# Patient Record
Sex: MALE | URBAN - METROPOLITAN AREA
[De-identification: names, ages, dates, MRNs, and addresses within clinical notes are randomized per-mention and may not be internally consistent; named-entity substitution may affect disease eponyms.]

---
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
## 2023-01-19 ENCOUNTER — EMERGENCY (EMERGENCY)
Facility: HOSPITAL | Age: 23
LOS: 0 days | Discharge: HOME | End: 2023-01-19
Attending: STUDENT IN AN ORGANIZED HEALTH CARE EDUCATION/TRAINING PROGRAM
Payer: MEDICAID

## 2023-01-19 VITALS
SYSTOLIC BLOOD PRESSURE: 121 MMHG | RESPIRATION RATE: 18 BRPM | TEMPERATURE: 98 F | HEIGHT: 68 IN | WEIGHT: 147.93 LBS | DIASTOLIC BLOOD PRESSURE: 80 MMHG | OXYGEN SATURATION: 98 % | HEART RATE: 88 BPM

## 2023-01-19 VITALS
TEMPERATURE: 99 F | HEART RATE: 78 BPM | RESPIRATION RATE: 18 BRPM | DIASTOLIC BLOOD PRESSURE: 76 MMHG | OXYGEN SATURATION: 98 % | SYSTOLIC BLOOD PRESSURE: 114 MMHG

## 2023-01-19 DIAGNOSIS — R07.89 OTHER CHEST PAIN: ICD-10-CM

## 2023-01-19 DIAGNOSIS — R05.1 ACUTE COUGH: ICD-10-CM

## 2023-01-19 DIAGNOSIS — J45.909 UNSPECIFIED ASTHMA, UNCOMPLICATED: ICD-10-CM

## 2023-01-19 DIAGNOSIS — Z20.822 CONTACT WITH AND (SUSPECTED) EXPOSURE TO COVID-19: ICD-10-CM

## 2023-01-19 LAB
FLUAV AG NPH QL: SIGNIFICANT CHANGE UP
FLUBV AG NPH QL: SIGNIFICANT CHANGE UP
SARS-COV-2 RNA SPEC QL NAA+PROBE: SIGNIFICANT CHANGE UP

## 2023-01-19 PROCEDURE — 93010 ELECTROCARDIOGRAM REPORT: CPT

## 2023-01-19 PROCEDURE — 99284 EMERGENCY DEPT VISIT MOD MDM: CPT

## 2023-01-19 RX ORDER — IPRATROPIUM/ALBUTEROL SULFATE 18-103MCG
3 AEROSOL WITH ADAPTER (GRAM) INHALATION ONCE
Refills: 0 | Status: COMPLETED | OUTPATIENT
Start: 2023-01-19 | End: 2023-01-19

## 2023-01-19 RX ORDER — ACETAMINOPHEN 500 MG
975 TABLET ORAL ONCE
Refills: 0 | Status: COMPLETED | OUTPATIENT
Start: 2023-01-19 | End: 2023-01-19

## 2023-01-19 RX ORDER — IBUPROFEN 200 MG
600 TABLET ORAL ONCE
Refills: 0 | Status: COMPLETED | OUTPATIENT
Start: 2023-01-19 | End: 2023-01-19

## 2023-01-19 RX ORDER — ALBUTEROL 90 UG/1
2 AEROSOL, METERED ORAL
Qty: 1 | Refills: 0
Start: 2023-01-19

## 2023-01-19 RX ADMIN — Medication 600 MILLIGRAM(S): at 08:12

## 2023-01-19 RX ADMIN — Medication 975 MILLIGRAM(S): at 08:12

## 2023-01-19 RX ADMIN — Medication 50 MILLIGRAM(S): at 08:13

## 2023-01-19 RX ADMIN — Medication 3 MILLILITER(S): at 08:14

## 2023-01-19 NOTE — ED PROVIDER NOTE - PHYSICAL EXAMINATION
Gen: NAD, AOx3, able to make needs known, non-toxic  Head: NCAT  HEENT: EOMI, oral mucosa moist, normal conjunctiva. posterior oropharynx w/o tonsilar erythema/edema/exudates. uvula midline  Lung: CTAB, no respiratory distress, no wheezes/rhonchi/rales B/L, speaking in full sentences  CV: RRR, no murmurs  Abd: non distended, soft, nontender, no guarding, no CVA tenderness  MSK: no visible deformities  Neuro: Appears non focal  Skin: Warm, well perfused  Psych: normal affect

## 2023-01-19 NOTE — ED PROVIDER NOTE - PATIENT PORTAL LINK FT
You can access the FollowMyHealth Patient Portal offered by [facility] by registering at the following website: [email or web address]. By joining [employer]’s FollowMyHealth portal, you will also be able to view your health information using other applications (apps) compatible with our system.

## 2023-01-19 NOTE — ED PROVIDER NOTE - OBJECTIVE STATEMENT
23 y/o M w/ PMH of asthma presenting w/ cough. Reports symptoms developing on Tuesday. Has been having chills and subjective fevers at home. Has been coughing, sometimes dry sometimes w/ some clear mucous. Having sore throat as well but no difficulty swallowing. Also w/ generalized body aches. Endorsing chest tightness that feels like his asthma w/ associated wheezing sensation. Reports saw friend recently who developed flu-like symptoms. Tried nyquil at home. Does not have any albuterol at home. Is not covid vaccinated. Is not flu vaccinated. Denies headache, dizziness, blurred vision, shortness of breath, abdominal pain, n/v/d/c, urinary symptoms, MSK pain, rash.

## 2023-01-19 NOTE — ED PROVIDER NOTE - NSFOLLOWUPINSTRUCTIONS_ED_ALL_ED_FT
1) Follow up with your doctor this week  2) Return to the ED immediately for new or worsening symptoms  3) Please continue to take any home medications as prescribed  4) Your test results from your ED visit were discussed with you prior to discharge  5) You were provided with a copy of your test results  6) Please take Tylenol 975 mg every 6 hours as needed for pain. Please do not exceed more than 4,000mg of Tylenol in a day   7) Please take Motrin 600 mg by mouth every 6 hours as needed for pain. Please take this medication with food.     Viral Respiratory Infection    A viral respiratory infection is an illness that affects parts of the body used for breathing, like the lungs, nose, and throat. It is caused by a germ called a virus. Symptoms can include runny nose, coughing, sneezing, fatigue, body aches, sore throat, fever, or headache. Over the counter medicine can be used to manage the symptoms but the infection typically goes away on its own in 5 to 10 days.     SEEK IMMEDIATE MEDICAL CARE IF YOU HAVE ANY OF THE FOLLOWING SYMPTOMS: shortness of breath, chest pain, fever over 10 days, or lightheadedness/dizziness.    Asthma    Asthma is a condition in which the airways tighten and narrow, making it difficult to breath. Asthma episodes, also called asthma attacks, range from minor to life-threatening. Symptoms include wheezing, coughing, chest tightness, or shortness of breath. The diagnosis of asthma is made by a review of your medical history and a physical exam, but may involve additional testing. Asthma cannot be cured, but medicines and lifestyle changes can help control it. Avoid triggers of asthma which may include animal dander, pollen, mold, smoke, air pollutants, etc.     SEEK IMMEDIATE MEDICAL CARE IF YOU HAVE ANY OF THE FOLLOWING SYMPTOMS: worsening of symptoms, shortness of breath at rest, chest pain, bluish discoloration to lips or fingertips, lightheadedness/dizziness, or fever.

## 2023-01-19 NOTE — ED PROVIDER NOTE - PROGRESS NOTE DETAILS
Attending Georgi: pt reports symptoms improving. viral swab neg. Discussed w/ pt. Medications sent to pharmacy. return precautions provided and discussed. tello MANUEL.

## 2023-01-19 NOTE — ED PROVIDER NOTE - CLINICAL SUMMARY MEDICAL DECISION MAKING FREE TEXT BOX
21 y/o M w/ PMH as above presenting w/ cough, chest tightness, and chills. pt overall well appearing, no acute distress. No resp distress. Vitals reassuring. No wheezing on exam, but pt endorsing symptoms feel like asthma exacerbation. Will provide 21 y/o M w/ PMH as above presenting w/ cough, chest tightness, and chills. pt overall well appearing, no acute distress. No resp distress. Vitals reassuring. No wheezing on exam, but pt endorsing symptoms feel like asthma exacerbation. Will provide duoneb and short course of steroids. Will obtain viral swab as symptoms likely represent viral URI. Doubtful ACS, will obtain screening EKG. Lungs clear, do not suspect PNA. Will reassess the need for additional interventions as clinically warranted.

## 2023-01-19 NOTE — ED ADULT NURSE NOTE - OBJECTIVE STATEMENT
Pt AAOx4.  22 year old male presents to ED with complaint of flu-like symptoms that began 2 days ago. Pt complaining of chills, dry cough, generalized weakness, also states his asthma is acting up and chest feels tight.  Denies chest pain, dyspnea, nausea/vomiting/diarrhea/constipation, dizziness, weakness, headache, fever, abdominal pain. Denies recent travel, hemoptysis. Respirations equal and unlabored, no acute distress noted at this time. Pt states his family is sick. PMH: asthma

## 2023-02-20 NOTE — ED PEDIATRIC NURSE NOTE - PAIN: PRESENCE, MLM
[facility]  INTERNAL MEDICINE OFFICE VISIT      CHIEF COMPLAINT:    Chief Complaint   Patient presents with   • Physical     Complete physical    • Ingestion     Possible food poisoning    • Sinus Problem     Pressure in ears, headaches,stuffy nose.        HISTORY OF PRESENT ILLNESS:    Petros You is a 24 year old male who presents today for follow up of active medical problems. The patient is also here for physical.    Feels congestion in the sinus, nose, and ears. Reports pressure in the ears which moves to jaw. Reports headache in the same side. The symptoms were worse couple weeks ago.     Reports that he had GI symptoms which has now resolved. He states that he had eaten rice before he developed the symptoms. The symptoms are now resolved.     Otherwise, he has no other concerns.     Patient Care Team:  Ruchi Verdugo DO as PCP - General (Internal Medicine)     PROBLEM LIST:    Patient Active Problem List   Diagnosis   • Acute tonsillitis   • Fever   • Right knee pain   • Acute recurrent maxillary sinusitis   • Pharyngitis   • Cough   • Rheumatoid nodule of knee (CMS/HCC)   • MDD (major depressive disorder), recurrent episode, moderate (CMS/HCC)   • Acute non-recurrent maxillary sinusitis   • Acute URI   • Headache   • Mixed obsessional thoughts and acts   • ERIKA (generalized anxiety disorder)       HISTORIES:    ALLERGIES:  No Known Allergies  Current Outpatient Medications   Medication Sig Dispense Refill   • VITAMIN D, CHOLECALCIFEROL, PO      • levocetirizine (XYZAL) 5 MG tablet Take 1 tablet by mouth every evening. 30 tablet 1   • fluticasone (FLONASE) 50 MCG/ACT nasal spray Spray 1 spray in each nostril daily. 16 g 1   • ibuprofen (MOTRIN) 400 MG tablet Take 1 tablet by mouth every 8 hours as needed for Pain. 30 tablet 1   • UNKNOWN TO PATIENT Anti diarheal per patient     • albuterol 108 (90 Base) MCG/ACT inhaler Inhale 2 puffs into the lungs every 4 hours as needed for Shortness of  Breath or Wheezing. 1 each 0   • omeprazole (PrilOSEC) 20 MG capsule Take 1 capsule by mouth daily. 30 capsule 0   • cyclobenzaprine (FLEXERIL) 10 MG tablet Take 1 tablet by mouth 3 times daily as needed for Muscle spasms. 15 tablet 0   • escitalopram (LEXAPRO) 20 MG tablet Take 1/2 tab po daily for 1 week, then increase to 1 tablet daily. 30 tablet 2     No current facility-administered medications for this visit.     Immunization History   Administered Date(s) Administered   • COVID Moderna 0.5 mL 12Y+ 12/11/2021, 06/10/2022   • COVID Moderna Bivalent Booster 0.5 mL 12Y+ 02/20/2023   • DTaP HIB 01/21/2002   • DTaP(INFANRIX) 2000, 2000, 01/02/2001, 07/28/2006   • HIB, Unspecified Formulation 2000   • HPV 9-Valent 05/08/2017   • HPV Quadrivalent 06/30/2014   • Hep A, ped/adol, 2 dose 08/12/2003, 07/28/2006   • Hep B, Unspecified Formulation 2000   • Hep B, adolescent or pediatric 2000, 03/27/2001   • Hib (PRP-OMP) 2000, 01/02/2001   • Influenza, quadrivalent, MDCK, preserve-free (FLUCELVAX) 12/11/2021   • Influenza, quadrivalent, preserve-free 10/30/2019, 02/11/2021, 02/20/2023   • Influenza, seasonal, injectable, preservative free 10/18/2011   • MMR 07/18/2001, 07/28/2006   • Meningococcal Conjugate MCV4P (Menactra) 10/18/2011, 05/08/2017   • Pneumococcal Conjugate 7 Valent 2000, 2000, 01/02/2001, 01/21/2002   • Polio, INACTIVATED 2000, 07/28/2006   • Polio, Unspecified Formulation 2000, 07/18/2001   • Tdap 10/18/2011, 06/10/2022   • Varicella 07/18/2001, 09/30/2008     Past Medical History:   Diagnosis Date   • Depression      Past Surgical History:   Procedure Laterality Date   • No past surgeries       Social History     Tobacco Use   • Smoking status: Never   • Smokeless tobacco: Never   Substance Use Topics   • Alcohol use: No   • Drug use: No     Family History   Problem Relation Age of Onset   • High blood pressure Father    • Heart disease Maternal  Grandmother    • Heart disease Maternal Grandfather    • Diabetes Paternal Grandmother    • Diabetes Paternal Grandfather        I have reviewed the patient's medications and allergies, past medical, surgical, social and family history, updating these as appropriate.  See Histories section of the electronic medical record for a display of this information.    REVIEW OF SYSTEMS:    Constitutional: Negative for fever.   Skin: Negative for rash.   HEENT: Positive for nasal congestion.   Respiratory: Negative for cough or shortness of breath.    Cardiovascular: Negative for chest pain, chest pressure, palpitations  Gastrointestinal: Negative for nausea, vomiting, or abdominal pain.   Extremities: Negative for joint swelling or joint pain.  Neurologic: Negative for change in sensory or motor function.  Endocrine: Negative for weight loss or gain.  Hematological: Negative for bleeding  Psychiatric: Negative for change in affect, change in mentation    PHYSICAL EXAM:  Vital Signs:    Visit Vitals  /64 (BP Location: LUE - Left upper extremity, Patient Position: Sitting, Cuff Size: Large Adult)   Pulse 87   Temp 97.9 °F (36.6 °C)   Ht 5' 9\" (1.753 m)   Wt 91.2 kg (201 lb)   SpO2 98%   BMI 29.68 kg/m²     Pulse Ox Interpretation:  Within normal limits.  General:   Alert, cooperative, conversive in no acute distress.   Head:  Normocephalic, atraumatic.   Eyes:  Normal conjunctivae and sclerae. Extraocular movements intact.  ENT:  Wearing a mask. Normal tympanic membranes and external auditory canals bilaterally.  Cardiovascular:  Symmetrical pulses.  Regular rate and rhythm without murmur.  Respiratory:  Normal respiratory effort.  Clear to auscultation.  No wheezes, rales or rhonchi.  Gastrointestinal:  Soft and non tender.   Musculoskeletal:  No deformity or edema. No tenderness to palpation.  Neurologic:   Alert. No focal deficits or lateralizing signs.  Psychiatric:   Cooperative.  Appropriate mood and  complains of pain/discomfort affect.    LABORATORY DATA:    Lab Results   Component Value Date    SODIUM 136 02/13/2023    SODIUM 140 02/09/2022    POTASSIUM 3.8 02/13/2023    POTASSIUM 4.6 02/09/2022    CHLORIDE 104 02/13/2023    CHLORIDE 107 02/09/2022    CO2 29 02/13/2023    CO2 27 02/09/2022    BUN 12 02/13/2023    BUN 12 02/09/2022    CREATININE 0.76 02/13/2023    CREATININE 0.81 02/09/2022    GLUCOSE 90 02/13/2023    GLUCOSE 89 02/09/2022     Lab Results   Component Value Date    WBC 7.1 02/13/2023    WBC 7.6 02/09/2022    HCT 44.7 02/13/2023    HCT 44.4 02/09/2022    HGB 14.9 02/13/2023    HGB 14.5 02/09/2022     02/13/2023     02/09/2022     Lab Results   Component Value Date    GLUCOSE 90 02/13/2023    GLUCOSE 89 02/09/2022    SODIUM 136 02/13/2023    SODIUM 140 02/09/2022    POTASSIUM 3.8 02/13/2023    POTASSIUM 4.6 02/09/2022    CHLORIDE 104 02/13/2023    CHLORIDE 107 02/09/2022    BUN 12 02/13/2023    BUN 12 02/09/2022    CREATININE 0.76 02/13/2023    CREATININE 0.81 02/09/2022    CALCIUM 9.3 02/13/2023    CALCIUM 9.7 02/09/2022    ALBUMIN 4.1 02/13/2023    ALBUMIN 4.0 02/09/2022    AST 13 02/13/2023    AST 12 02/09/2022    ALKPT 94 02/13/2023    ALKPT 95 02/09/2022    GPT 29 02/13/2023    GPT 29 02/09/2022    ANIONGAP 7 02/13/2023    ANIONGAP 11 02/09/2022    BCRAT 16 02/13/2023    BCRAT 15 02/09/2022    GLOB 3.3 02/13/2023    GLOB 3.5 02/09/2022    AGR 1.2 02/13/2023    AGR 1.1 02/09/2022     Hemoglobin A1C (%)   Date Value   02/13/2023 5.0   02/09/2022 5.5     No results found for: INR  TSH (mcUnits/mL)   Date Value   02/13/2023 2.077   02/09/2022 1.299     Lab Results   Component Value Date    COL YELLOW 05/28/2019    UAPP CLEAR 05/28/2019    USPG 1.027 05/28/2019    UPH 6.0 05/28/2019    UPROT NEGATIVE 05/28/2019    UGLU NEGATIVE 05/28/2019    UKET NEGATIVE 05/28/2019    UBILI NEGATIVE 05/28/2019    URBC NEGATIVE 05/28/2019    UNITR NEGATIVE 05/28/2019    UROB 0.2 05/28/2019    UWBC NEGATIVE 05/28/2019     Lab  Results   Component Value Date    CHOLESTEROL 165 02/13/2023    CHOLESTEROL 158 02/09/2022    HDL 55 02/13/2023    HDL 46 02/09/2022    CALCLDL 98 02/13/2023    CALCLDL 99 02/09/2022    TRIGLYCERIDE 58 02/13/2023    TRIGLYCERIDE 65 02/09/2022     Lab Results   Component Value Date    AST 13 02/13/2023    AST 12 02/09/2022    GPT 29 02/13/2023    GPT 29 02/09/2022    ALKPT 94 02/13/2023    ALKPT 95 02/09/2022    BILIRUBIN 0.4 02/13/2023    BILIRUBIN 0.3 02/09/2022     No results found for: PSA  No results found for: CPK    DEPRESSION ASSESSMENT/PLAN:  Recent Review Flowsheet Data     Date 2/20/2023    Adult PHQ 2 Score 2    Adult PHQ 2 Interpretation No further screening needed    Little interest or pleasure in activity? Not at all    Feeling down, depressed or hopeless? More than half the days        Depression screening is negative no further plan needed.      ASSESSMENT / PLAN    Routine physical examination  - Performed and reviewed.   - Depression screening is negative.   - Labs reviewed and discussed.   - Vaccines discussed and updated.   - Screening tests discussed. Colonoscopy at 45 and PSA at 50.   - Continue annual physical.   - CBC WITH DIFFERENTIAL; Future  - COMPREHENSIVE METABOLIC PANEL; Future  - THYROID STIMULATING HORMONE REFLEX; Future  - LIPID PANEL WITH REFLEX; Future  - GLYCOHEMOGLOBIN; Future    Nasal congestion  - Low suspicion for infection. More likely allergic symptoms.   - Xyzal and Flonase sent. If any symptoms change occurs such as cough or increased phlegm production, then can notify me.   - levocetirizine (XYZAL) 5 MG tablet; Take 1 tablet by mouth every evening.  Dispense: 30 tablet; Refill: 1  - fluticasone (FLONASE) 50 MCG/ACT nasal spray; Spray 1 spray in each nostril daily.  Dispense: 16 g; Refill: 1    Vitamin D deficiency  - Start taking OTC vitamin D 2000 IU daily.     Need for COVID-19 vaccine  - COVID MODERNA BIVALENT BOOSTER 0.5 ML 12Y+    Need for vaccination  - INFLUENZA  QUADRIVALENT SPLIT PRES FREE 0.5 ML VACC, IM (FLULAVAL,FLUARIX,FLUZONE)       Orders Placed This Encounter   • COVID Moderna Bivalent Booster 0.5 mL 12Y+ - [id number]   • Influenza Quadrivalent Split Pres Free 0.5 mL Pre-filled Vacc (FluLaval, Fluzone, Fluarix; ages 6+ months - [id number]   • CBC with Automated Differential   • Comprehensive Metabolic Panel   • Thyroid Stimulating Hormone Reflex   • Lipid Panel With Reflex   • Glycohemoglobin   • VITAMIN D, CHOLECALCIFEROL, PO   • levocetirizine (XYZAL) 5 MG tablet   • fluticasone (FLONASE) 50 MCG/ACT nasal spray       Return in about 1 year (around 2/20/2024), or if symptoms worsen or fail to improve, for annual physical.    Instructions provided as documented in the after visit summary.    The patient indicated understanding of the diagnosis and agreed with the plan of care.

## 2023-03-06 ENCOUNTER — EMERGENCY (EMERGENCY)
Facility: HOSPITAL | Age: 23
LOS: 0 days | Discharge: HOME | End: 2023-03-06
Attending: STUDENT IN AN ORGANIZED HEALTH CARE EDUCATION/TRAINING PROGRAM
Payer: MEDICAID

## 2023-03-06 VITALS
DIASTOLIC BLOOD PRESSURE: 77 MMHG | SYSTOLIC BLOOD PRESSURE: 134 MMHG | HEART RATE: 68 BPM | TEMPERATURE: 98 F | HEIGHT: 68 IN | OXYGEN SATURATION: 96 % | WEIGHT: 160.06 LBS | RESPIRATION RATE: 19 BRPM

## 2023-03-06 VITALS
HEART RATE: 66 BPM | OXYGEN SATURATION: 99 % | TEMPERATURE: 98 F | SYSTOLIC BLOOD PRESSURE: 119 MMHG | DIASTOLIC BLOOD PRESSURE: 73 MMHG | RESPIRATION RATE: 18 BRPM

## 2023-03-06 DIAGNOSIS — J45.909 UNSPECIFIED ASTHMA, UNCOMPLICATED: ICD-10-CM

## 2023-03-06 DIAGNOSIS — Z20.822 CONTACT WITH AND (SUSPECTED) EXPOSURE TO COVID-19: ICD-10-CM

## 2023-03-06 DIAGNOSIS — R10.13 EPIGASTRIC PAIN: ICD-10-CM

## 2023-03-06 DIAGNOSIS — R11.2 NAUSEA WITH VOMITING, UNSPECIFIED: ICD-10-CM

## 2023-03-06 LAB
ALBUMIN SERPL ELPH-MCNC: 5.2 G/DL — HIGH (ref 3.3–5)
ALP SERPL-CCNC: 66 U/L — SIGNIFICANT CHANGE UP (ref 40–120)
ALT FLD-CCNC: 28 U/L — SIGNIFICANT CHANGE UP (ref 12–78)
ANION GAP SERPL CALC-SCNC: 14 MMOL/L — SIGNIFICANT CHANGE UP (ref 5–17)
AST SERPL-CCNC: 19 U/L — SIGNIFICANT CHANGE UP (ref 15–37)
BASOPHILS # BLD AUTO: 0.03 K/UL — SIGNIFICANT CHANGE UP (ref 0–0.2)
BASOPHILS NFR BLD AUTO: 0.2 % — SIGNIFICANT CHANGE UP (ref 0–2)
BILIRUB SERPL-MCNC: 1.1 MG/DL — SIGNIFICANT CHANGE UP (ref 0.2–1.2)
BUN SERPL-MCNC: 12 MG/DL — SIGNIFICANT CHANGE UP (ref 7–23)
CALCIUM SERPL-MCNC: 10.7 MG/DL — HIGH (ref 8.5–10.1)
CHLORIDE SERPL-SCNC: 103 MMOL/L — SIGNIFICANT CHANGE UP (ref 96–108)
CO2 SERPL-SCNC: 21 MMOL/L — LOW (ref 22–31)
CREAT SERPL-MCNC: 1.23 MG/DL — SIGNIFICANT CHANGE UP (ref 0.5–1.3)
EGFR: 86 ML/MIN/1.73M2 — SIGNIFICANT CHANGE UP
EOSINOPHIL # BLD AUTO: 0 K/UL — SIGNIFICANT CHANGE UP (ref 0–0.5)
EOSINOPHIL NFR BLD AUTO: 0 % — SIGNIFICANT CHANGE UP (ref 0–6)
FLUAV AG NPH QL: SIGNIFICANT CHANGE UP
FLUBV AG NPH QL: SIGNIFICANT CHANGE UP
GLUCOSE SERPL-MCNC: 148 MG/DL — HIGH (ref 70–99)
HCG SERPL-ACNC: <1 MIU/ML — SIGNIFICANT CHANGE UP
HCT VFR BLD CALC: 46.1 % — SIGNIFICANT CHANGE UP (ref 39–50)
HGB BLD-MCNC: 16.5 G/DL — SIGNIFICANT CHANGE UP (ref 13–17)
IMM GRANULOCYTES NFR BLD AUTO: 0.4 % — SIGNIFICANT CHANGE UP (ref 0–0.9)
LIDOCAIN IGE QN: 77 U/L — SIGNIFICANT CHANGE UP (ref 73–393)
LYMPHOCYTES # BLD AUTO: 1.08 K/UL — SIGNIFICANT CHANGE UP (ref 1–3.3)
LYMPHOCYTES # BLD AUTO: 7.5 % — LOW (ref 13–44)
MCHC RBC-ENTMCNC: 31.7 PG — SIGNIFICANT CHANGE UP (ref 27–34)
MCHC RBC-ENTMCNC: 35.8 G/DL — SIGNIFICANT CHANGE UP (ref 32–36)
MCV RBC AUTO: 88.7 FL — SIGNIFICANT CHANGE UP (ref 80–100)
MONOCYTES # BLD AUTO: 0.92 K/UL — HIGH (ref 0–0.9)
MONOCYTES NFR BLD AUTO: 6.4 % — SIGNIFICANT CHANGE UP (ref 2–14)
NEUTROPHILS # BLD AUTO: 12.31 K/UL — HIGH (ref 1.8–7.4)
NEUTROPHILS NFR BLD AUTO: 85.5 % — HIGH (ref 43–77)
NRBC # BLD: 0 /100 WBCS — SIGNIFICANT CHANGE UP (ref 0–0)
PLATELET # BLD AUTO: 358 K/UL — SIGNIFICANT CHANGE UP (ref 150–400)
POTASSIUM SERPL-MCNC: 3.2 MMOL/L — LOW (ref 3.5–5.3)
POTASSIUM SERPL-SCNC: 3.2 MMOL/L — LOW (ref 3.5–5.3)
PROT SERPL-MCNC: 9.2 GM/DL — HIGH (ref 6–8.3)
RBC # BLD: 5.2 M/UL — SIGNIFICANT CHANGE UP (ref 4.2–5.8)
RBC # FLD: 13.6 % — SIGNIFICANT CHANGE UP (ref 10.3–14.5)
SARS-COV-2 RNA SPEC QL NAA+PROBE: SIGNIFICANT CHANGE UP
SODIUM SERPL-SCNC: 138 MMOL/L — SIGNIFICANT CHANGE UP (ref 135–145)
WBC # BLD: 14.4 K/UL — HIGH (ref 3.8–10.5)
WBC # FLD AUTO: 14.4 K/UL — HIGH (ref 3.8–10.5)

## 2023-03-06 PROCEDURE — 74177 CT ABD & PELVIS W/CONTRAST: CPT | Mod: 26,MA

## 2023-03-06 PROCEDURE — 99284 EMERGENCY DEPT VISIT MOD MDM: CPT

## 2023-03-06 RX ORDER — FAMOTIDINE 10 MG/ML
20 INJECTION INTRAVENOUS ONCE
Refills: 0 | Status: COMPLETED | OUTPATIENT
Start: 2023-03-06 | End: 2023-03-06

## 2023-03-06 RX ORDER — ONDANSETRON 8 MG/1
4 TABLET, FILM COATED ORAL ONCE
Refills: 0 | Status: COMPLETED | OUTPATIENT
Start: 2023-03-06 | End: 2023-03-06

## 2023-03-06 RX ORDER — FAMOTIDINE 10 MG/ML
1 INJECTION INTRAVENOUS
Qty: 20 | Refills: 0
Start: 2023-03-06 | End: 2023-03-15

## 2023-03-06 RX ORDER — SODIUM CHLORIDE 9 MG/ML
1000 INJECTION INTRAMUSCULAR; INTRAVENOUS; SUBCUTANEOUS ONCE
Refills: 0 | Status: COMPLETED | OUTPATIENT
Start: 2023-03-06 | End: 2023-03-06

## 2023-03-06 RX ORDER — KETOROLAC TROMETHAMINE 30 MG/ML
15 SYRINGE (ML) INJECTION ONCE
Refills: 0 | Status: DISCONTINUED | OUTPATIENT
Start: 2023-03-06 | End: 2023-03-06

## 2023-03-06 RX ADMIN — SODIUM CHLORIDE 1000 MILLILITER(S): 9 INJECTION INTRAMUSCULAR; INTRAVENOUS; SUBCUTANEOUS at 01:34

## 2023-03-06 RX ADMIN — Medication 30 MILLILITER(S): at 01:34

## 2023-03-06 RX ADMIN — ONDANSETRON 4 MILLIGRAM(S): 8 TABLET, FILM COATED ORAL at 01:34

## 2023-03-06 RX ADMIN — Medication 15 MILLIGRAM(S): at 02:13

## 2023-03-06 RX ADMIN — FAMOTIDINE 20 MILLIGRAM(S): 10 INJECTION INTRAVENOUS at 01:35

## 2023-03-06 NOTE — ED PROVIDER NOTE - NS ED ROS FT
CONST: no fevers, no chills  EYES: no pain, no vision changes  ENT: no sore throat, no ear pain, no change in hearing  CV: no chest pain, no leg swelling  RESP: no shortness of breath, no cough  ABD: (+) abdominal pain, (+) nausea, (+) vomiting, no diarrhea  : no dysuria, no flank pain, no hematuria  MSK: no back pain, no extremity pain  NEURO: no headache or additional neurologic complaints  HEME: no easy bleeding  SKIN:  no rash

## 2023-03-06 NOTE — ED PROVIDER NOTE - PHYSICAL EXAMINATION
General: No acute distress, mentation at baseline,  well nourished, well developed  HEENT: NCAT, Neck supple without meningismus, PERRL, no conjunctival injection  Lungs: CTAB, No wheeze or crackles, No retractions, No increased work of breathing  Heart: S1S2 RRR, No M/R/G, Pules equal Bilaterally in upper and lower extremities distally  Abd: soft, (+) LUQ and epigastreic tendernessm, ND, No guarding, No rebound.  No hernias, no palpable masses.  Extrem: FROM in all joints, no gross deformities appreciated, no significant edema noted, No ulcers. Cap refil < 2sec.  Skin: No rash noted, warm dry.  Neuro:  Grossly normal.  No difficulty ambulating. No focal deficits.  Psychiatric: Appropriate mood and affect.

## 2023-03-06 NOTE — ED PROVIDER NOTE - CLINICAL SUMMARY MEDICAL DECISION MAKING FREE TEXT BOX
Differential diagnosis includes: gastritis vs pancreaitits vs GERD. Abdominal exam without peritoneal signs. No evidence of acute abdomen at this time. Well appearing. Low suspicion for acute hepatobiliary disease (includng acute cholecystitis), acute infectious processes (pneumonia, hepatitis, pyelonephritis), acute appendicitis, vascular catastrophe, bowel obstruction or viscus perforation. Presentation not consistent with other acute, emergent causes of abdominal pain at this time.    Plan: labs, CT AP, pain control, serial reassessment

## 2023-03-06 NOTE — ED PROVIDER NOTE - PATIENT PORTAL LINK FT
You can access the FollowMyHealth Patient Portal offered by [facility] by registering at the following website: [email or web address]. By joining [facility]’s FollowMyHealth portal, you will also be able to view your health information using other applications (apps) compatible with our system.

## 2023-03-06 NOTE — ED ADULT TRIAGE NOTE - CHIEF COMPLAINT QUOTE
reports LUQ ABD pain r/t epigastric area a/w multiple episodes NB vomitus x today. recently in ED today for same, states pain persistent.

## 2023-03-06 NOTE — ED ADULT NURSE NOTE - OBJECTIVE STATEMENT
Pt is a 22y M AOx4 with no sig pmh. Pt states he has upper quadrant abdominal pain. Pt states he's been vomiting and had nausea. Pt had 3 bouts of vomit in the ED. Pt denies diarrhea, fever, cough, chills, shortness of breath and chest pain.

## 2023-03-06 NOTE — ED PROVIDER NOTE - PROGRESS NOTE DETAILS
pt seen on cellphone but when providers passby, pt immediately begins to clutch abdomen and start crying, requesting opioid medications. pt seen attempting to self induce vomitting. likely malingering. CT negative, no indication for furhter medications. pain distractible. will DC w/ GI f/u

## 2023-03-06 NOTE — ED PROVIDER NOTE - NSFOLLOWUPCLINICS_GEN_ALL_ED_FT
[facility]  Gastroenterology  [address] [place] [address] [address]  [place] [address]  Phone: [phone]  Fax:

## 2023-03-06 NOTE — ED PROVIDER NOTE - OBJECTIVE STATEMENT
22-year-old-year-old male with no past medical history presenting with abdominal pain.  Patient states he has accompanying, came here earlier for the same.  States that abdominal pain is in the quadrants, denies any diarrhea constipation fever cough chills shortness of breath chest pain dysuria hematuria.  Denies alcohol or drug use.  Denies any past abdominal surgeries

## 2023-11-11 NOTE — ED PEDIATRIC NURSE NOTE - DURATION
Patient transferred to [facility] in stable condition. /106, patient asymptomatic. Writer paged Dr. Castillo MD. Heparin gtt continued at 18u/kg/hr. Patient oriented to room and call light. Bed alarm enabled.     Per Dr. Chong, give Q6 PRN 10mg IV hydralazine for SBP > 180.   today
